# Patient Record
Sex: FEMALE | Race: WHITE | NOT HISPANIC OR LATINO | Employment: PART TIME | URBAN - METROPOLITAN AREA
[De-identification: names, ages, dates, MRNs, and addresses within clinical notes are randomized per-mention and may not be internally consistent; named-entity substitution may affect disease eponyms.]

---

## 2022-10-27 ENCOUNTER — OFFICE VISIT (OUTPATIENT)
Dept: URGENT CARE | Facility: CLINIC | Age: 22
End: 2022-10-27
Payer: COMMERCIAL

## 2022-10-27 VITALS
TEMPERATURE: 97.5 F | RESPIRATION RATE: 16 BRPM | BODY MASS INDEX: 15.43 KG/M2 | DIASTOLIC BLOOD PRESSURE: 56 MMHG | SYSTOLIC BLOOD PRESSURE: 100 MMHG | HEIGHT: 66 IN | OXYGEN SATURATION: 97 % | HEART RATE: 102 BPM | WEIGHT: 96 LBS

## 2022-10-27 DIAGNOSIS — J02.0 STREP PHARYNGITIS: ICD-10-CM

## 2022-10-27 LAB
INT CON NEG: NORMAL
INT CON POS: NORMAL
S PYO AG THROAT QL: POSITIVE

## 2022-10-27 PROCEDURE — 87880 STREP A ASSAY W/OPTIC: CPT | Performed by: STUDENT IN AN ORGANIZED HEALTH CARE EDUCATION/TRAINING PROGRAM

## 2022-10-27 PROCEDURE — 99203 OFFICE O/P NEW LOW 30 MIN: CPT | Performed by: STUDENT IN AN ORGANIZED HEALTH CARE EDUCATION/TRAINING PROGRAM

## 2022-10-27 RX ORDER — DROSPIRENONE AND ETHINYL ESTRADIOL 0.02-3(28)
1 KIT ORAL DAILY
COMMUNITY
Start: 2022-08-19

## 2022-10-27 RX ORDER — AMOXICILLIN 500 MG/1
1000 CAPSULE ORAL DAILY
Qty: 20 CAPSULE | Refills: 0 | Status: SHIPPED | OUTPATIENT
Start: 2022-10-27 | End: 2022-11-06

## 2022-10-27 NOTE — PROGRESS NOTES
"Subjective:   Mckenna Portillo is a 22 y.o. female who presents for Pharyngitis (Pt has a fever, sore throat, chills x 3 day )      HPI:  Pleasant 22-year-old female presents to clinic for 3 days of sore throat and chills.  No recent sick contacts or recent travel.  She has been using some ibuprofen for her symptoms with improvement.  She is able to maintain adequate oral intake of fluids and solids.  No worsening factors.  She denies current fever, diaphoresis, rash, ear pain, ear discharge, nasal congestion, sinus pain, eye discharge, eye redness, chest pain, cough, sputum production, shortness of breath, nausea, vomiting, abdominal pain, dizziness, or headache.    Medications:    amoxicillin Caps  drospirenone-ethinyl estradiol    Allergies: Patient has no known allergies.    Problem List: Mckenna Portillo does not have a problem list on file.    Surgical History:  No past surgical history on file.    Past Social Hx: Mckenna Portillo  reports that she has never smoked. She has never used smokeless tobacco. She reports that she does not currently use alcohol. She reports that she does not currently use drugs.     Past Family Hx:  Mckenna Portillo family history is not on file.     Problem list, medications, and allergies reviewed by myself today in Epic.     Objective:     /56 (BP Location: Left arm, Patient Position: Sitting, BP Cuff Size: Small adult)   Pulse (!) 102   Temp 36.4 °C (97.5 °F) (Temporal)   Resp 16   Ht 1.676 m (5' 6\")   Wt 43.5 kg (96 lb)   SpO2 97%   BMI 15.49 kg/m²     Physical Exam  Vitals reviewed.   Constitutional:       General: She is not in acute distress.     Appearance: Normal appearance.   HENT:      Head: Normocephalic.      Right Ear: Tympanic membrane, ear canal and external ear normal.      Left Ear: Tympanic membrane, ear canal and external ear normal.      Nose: Nose normal.      Mouth/Throat:      Mouth: Mucous membranes are moist.      Pharynx: Oropharynx is clear. " Uvula midline. Posterior oropharyngeal erythema present. No pharyngeal swelling, oropharyngeal exudate or uvula swelling.      Tonsils: Tonsillar exudate present. No tonsillar abscesses. 3+ on the right. 3+ on the left.   Eyes:      Conjunctiva/sclera: Conjunctivae normal.      Pupils: Pupils are equal, round, and reactive to light.   Cardiovascular:      Rate and Rhythm: Normal rate and regular rhythm.      Pulses: Normal pulses.      Heart sounds: Normal heart sounds. No murmur heard.  Pulmonary:      Effort: Pulmonary effort is normal. No respiratory distress.      Breath sounds: Normal breath sounds. No stridor. No wheezing, rhonchi or rales.   Musculoskeletal:      Cervical back: Normal range of motion.   Lymphadenopathy:      Cervical: No cervical adenopathy.   Skin:     General: Skin is warm and dry.      Capillary Refill: Capillary refill takes less than 2 seconds.      Findings: No erythema, lesion or rash.   Neurological:      General: No focal deficit present.      Mental Status: She is alert and oriented to person, place, and time.       Lab Results/POC Test Results   Results for orders placed or performed in visit on 10/27/22   POCT Rapid Strep A   Result Value Ref Range    Rapid Strep Screen positive     Internal Control Positive Valid     Internal Control Negative Valid            Assessment/Plan:     Diagnosis and associated orders:     1. Strep pharyngitis  POCT Rapid Strep A    amoxicillin (AMOXIL) 500 MG Cap         Comments/MDM:     POCT strep a positive.  Patient's presentation to the exam findings are consistent with a diagnosis of strep pharyngitis.  Patient's vitals are all within normal limits except for slightly elevated heart rate of 102.  I do suspect that this is related to her current infection.  She is afebrile.  We will treat with amoxicillin.  Patient was educated use this medication and possible side effects including allergic reaction.  Patient denies known allergy to this  medication.  She may also use ibuprofen/Tylenol as needed, warm salt water gargles, humidifier, nasal saline spray, and plenty of oral hydration with warm/cold fluids.  Patient is nontoxic and stable this time.  ED/return precautions were given.         Differential diagnosis, natural history, supportive care, and indications for immediate follow-up discussed.    Advised the patient to follow-up with the primary care physician for recheck, reevaluation, and consideration of further management.    Please note that this dictation was created using voice recognition software. I have made a reasonable attempt to correct obvious errors, but I expect that there are errors of grammar and possibly content that I did not discover before finalizing the note.    Electronically signed by Sanchez Aguayo PA-C.

## 2022-11-08 ENCOUNTER — OFFICE VISIT (OUTPATIENT)
Dept: URGENT CARE | Facility: CLINIC | Age: 22
End: 2022-11-08
Payer: COMMERCIAL

## 2022-11-08 VITALS
DIASTOLIC BLOOD PRESSURE: 70 MMHG | RESPIRATION RATE: 16 BRPM | HEIGHT: 65 IN | HEART RATE: 124 BPM | WEIGHT: 98.2 LBS | TEMPERATURE: 97.2 F | BODY MASS INDEX: 16.36 KG/M2 | SYSTOLIC BLOOD PRESSURE: 116 MMHG | OXYGEN SATURATION: 98 %

## 2022-11-08 DIAGNOSIS — J03.01 RECURRENT STREPTOCOCCAL TONSILLITIS: Primary | ICD-10-CM

## 2022-11-08 DIAGNOSIS — J02.9 PHARYNGITIS, UNSPECIFIED ETIOLOGY: ICD-10-CM

## 2022-11-08 LAB
INT CON NEG: NORMAL
INT CON POS: NORMAL
S PYO AG THROAT QL: POSITIVE

## 2022-11-08 PROCEDURE — 99213 OFFICE O/P EST LOW 20 MIN: CPT | Performed by: PHYSICIAN ASSISTANT

## 2022-11-08 PROCEDURE — 87880 STREP A ASSAY W/OPTIC: CPT | Performed by: PHYSICIAN ASSISTANT

## 2022-11-08 RX ORDER — AMOXICILLIN AND CLAVULANATE POTASSIUM 875; 125 MG/1; MG/1
1 TABLET, FILM COATED ORAL 2 TIMES DAILY
Qty: 20 TABLET | Refills: 0 | Status: SHIPPED | OUTPATIENT
Start: 2022-11-08 | End: 2022-12-08

## 2022-11-08 ASSESSMENT — ENCOUNTER SYMPTOMS
SHORTNESS OF BREATH: 0
ABDOMINAL PAIN: 0
SPUTUM PRODUCTION: 0
SORE THROAT: 1
NAUSEA: 0
CHILLS: 0
COUGH: 0
VOMITING: 0
WHEEZING: 0
FEVER: 0
DIARRHEA: 0

## 2022-11-08 NOTE — PROGRESS NOTES
"Subjective:   Mckenna Portillo  is a 22 y.o. female who presents for Pharyngitis      Pharyngitis   Pertinent negatives include no abdominal pain, congestion, coughing, diarrhea, ear pain, shortness of breath or vomiting.   Patient presents urgent care complaint of sore throat.  She was seen to the urgent care 12 days ago and tested positive for strep pharyngitis.  She was prescribed amoxicillin 500 mg twice daily x10 days.  She reported completing antibiotic without issue.  She states she had around 2 days of improved symptoms thereafter.  She abruptly noted sore throat with white patches again this morning.  Did have some chills and body aches this morning.  Denies cough.  Denies nausea vomiting abdominal pain diarrhea or rash.  Patient does have mild right ear discomfort.  Did take some Advil this morning.  Denies past medical history of recurrence of strep but has had strep many times.  Patient declines ENT referral.    Review of Systems   Constitutional:  Negative for chills and fever.   HENT:  Positive for sore throat. Negative for congestion and ear pain.    Respiratory:  Negative for cough, sputum production, shortness of breath and wheezing.    Gastrointestinal:  Negative for abdominal pain, diarrhea, nausea and vomiting.   Skin:  Negative for rash.     No Known Allergies     Objective:   /70 (BP Location: Right arm, Patient Position: Sitting, BP Cuff Size: Adult long)   Pulse (!) 124   Temp 36.2 °C (97.2 °F)   Resp 16   Ht 1.651 m (5' 5\")   Wt 44.5 kg (98 lb 3.2 oz)   SpO2 98%   BMI 16.34 kg/m²     Physical Exam  Vitals and nursing note reviewed.   Constitutional:       General: She is not in acute distress.     Appearance: She is well-developed. She is not diaphoretic.   HENT:      Head: Normocephalic and atraumatic.      Right Ear: Tympanic membrane, ear canal and external ear normal.      Left Ear: Tympanic membrane, ear canal and external ear normal.      Nose: Nose normal.      " Mouth/Throat:      Lips: Pink.      Mouth: Mucous membranes are moist.      Pharynx: Uvula midline. Posterior oropharyngeal erythema (deeper) present. No oropharyngeal exudate.      Tonsils: Tonsillar exudate present. No tonsillar abscesses. 1+ on the right. 1+ on the left.   Eyes:      General: Lids are normal. No scleral icterus.        Right eye: No discharge.         Left eye: No discharge.      Conjunctiva/sclera: Conjunctivae normal.   Pulmonary:      Effort: Pulmonary effort is normal. No respiratory distress.      Breath sounds: Normal breath sounds. No stridor. No decreased breath sounds, wheezing, rhonchi or rales.   Musculoskeletal:         General: Normal range of motion.      Cervical back: Neck supple.   Skin:     General: Skin is warm and dry.      Coloration: Skin is not pale.      Findings: No erythema.   Neurological:      Mental Status: She is alert and oriented to person, place, and time. She is not disoriented.   Psychiatric:         Speech: Speech normal.         Behavior: Behavior normal.   Point-of-care testing for strep is positive    Assessment/Plan:   1. Recurrent streptococcal tonsillitis  - amoxicillin-clavulanate (AUGMENTIN) 875-125 MG Tab; Take 1 Tablet by mouth 2 times a day.  Dispense: 20 Tablet; Refill: 0    2. Pharyngitis, unspecified etiology  - POCT Rapid Strep A  Supportive care is reviewed with patient/caregiver - recommend to push PO fluids and electrolytes,  take full course of Rx, take with probiotics, observe for resolution  Return to clinic with lack of resolution or progression of symptoms.  Patient declines ENT referral.  I have worn an N95 mask, gloves and eye protection for the entire encounter with this patient.     Differential diagnosis, natural history, supportive care, and indications for immediate follow-up discussed.

## 2022-12-08 ENCOUNTER — OFFICE VISIT (OUTPATIENT)
Dept: URGENT CARE | Facility: CLINIC | Age: 22
End: 2022-12-08
Payer: COMMERCIAL

## 2022-12-08 VITALS
OXYGEN SATURATION: 100 % | SYSTOLIC BLOOD PRESSURE: 102 MMHG | TEMPERATURE: 98.3 F | HEART RATE: 117 BPM | RESPIRATION RATE: 12 BRPM | WEIGHT: 98.8 LBS | HEIGHT: 65 IN | BODY MASS INDEX: 16.46 KG/M2 | DIASTOLIC BLOOD PRESSURE: 58 MMHG

## 2022-12-08 DIAGNOSIS — J03.01 RECURRENT STREPTOCOCCAL TONSILLITIS: ICD-10-CM

## 2022-12-08 LAB
INT CON NEG: NORMAL
INT CON POS: NORMAL
S PYO AG THROAT QL: POSITIVE

## 2022-12-08 PROCEDURE — 99213 OFFICE O/P EST LOW 20 MIN: CPT | Performed by: PHYSICIAN ASSISTANT

## 2022-12-08 PROCEDURE — 87880 STREP A ASSAY W/OPTIC: CPT | Performed by: PHYSICIAN ASSISTANT

## 2022-12-08 RX ORDER — AMOXICILLIN AND CLAVULANATE POTASSIUM 875; 125 MG/1; MG/1
1 TABLET, FILM COATED ORAL 2 TIMES DAILY
Qty: 20 TABLET | Refills: 0 | Status: SHIPPED | OUTPATIENT
Start: 2022-12-08 | End: 2022-12-08

## 2022-12-08 RX ORDER — AZITHROMYCIN 250 MG/1
TABLET, FILM COATED ORAL
Qty: 6 TABLET | Refills: 0 | Status: SHIPPED | OUTPATIENT
Start: 2022-12-08

## 2022-12-08 ASSESSMENT — ENCOUNTER SYMPTOMS
ABDOMINAL PAIN: 0
COUGH: 0
SORE THROAT: 1
DIARRHEA: 0
CHILLS: 0
FEVER: 1
NAUSEA: 0
VOMITING: 0

## 2022-12-08 NOTE — PROGRESS NOTES
"Subjective:   Mckenna Portillo  is a 22 y.o. female who presents for Pharyngitis (Pt has a fever, sore throat, white spots on back of throat x last night)      Pharyngitis   Pertinent negatives include no abdominal pain, coughing, diarrhea (resolved), ear pain or vomiting. Patient presents urgent care noting last 24 hours of sore throat.  Patient has had strep pharyngitis twice over the last 6 weeks.  She was treated first with amoxicillin and thereafter with Augmentin.  She reports complete resolution of symptoms for the last over 10 days.  Complains of return of symptoms of sore throat and body aches yesterday.  Denies cough.  Denies ear pain.  Denies nausea vomiting abdominal pain.  Patient did have diarrhea the other day.  Did have strep pharyngitis on high school but has not prior to the above-mentioned episodes since.  At last evaluation she declined an ENT referral but is interested in seeing ENT now.  Patient would like to avoid Augmentin and prefers azithromycin for coverage at this time.    Review of Systems   Constitutional:  Positive for fever. Negative for chills.   HENT:  Positive for sore throat. Negative for ear pain.    Respiratory:  Negative for cough.    Gastrointestinal:  Negative for abdominal pain, diarrhea (resolved), nausea and vomiting.   Skin:  Negative for rash.     No Known Allergies     Objective:   /58 (BP Location: Left arm, Patient Position: Sitting, BP Cuff Size: Small adult)   Pulse (!) 117   Temp 36.8 °C (98.3 °F) (Temporal)   Resp 12   Ht 1.651 m (5' 5\")   Wt 44.8 kg (98 lb 12.8 oz)   SpO2 100%   BMI 16.44 kg/m²     Physical Exam  Vitals and nursing note reviewed.   Constitutional:       General: She is not in acute distress.     Appearance: She is well-developed. She is not diaphoretic.   HENT:      Head: Normocephalic and atraumatic.      Right Ear: Tympanic membrane, ear canal and external ear normal.      Left Ear: Tympanic membrane, ear canal and external ear " normal.      Nose: Nose normal.      Mouth/Throat:      Lips: Pink.      Mouth: Mucous membranes are moist.      Pharynx: Uvula midline. Posterior oropharyngeal erythema (deeper) present. No oropharyngeal exudate.      Tonsils: Tonsillar exudate present. No tonsillar abscesses. 2+ on the right. 2+ on the left.   Eyes:      General: Lids are normal. No scleral icterus.        Right eye: No discharge.         Left eye: No discharge.      Conjunctiva/sclera: Conjunctivae normal.   Pulmonary:      Effort: Pulmonary effort is normal. No respiratory distress.      Breath sounds: Normal breath sounds. No stridor. No decreased breath sounds, wheezing, rhonchi or rales.   Musculoskeletal:         General: Normal range of motion.      Cervical back: Neck supple.   Skin:     General: Skin is warm and dry.      Coloration: Skin is not pale.      Findings: No erythema.   Neurological:      Mental Status: She is alert and oriented to person, place, and time. She is not disoriented.   Psychiatric:         Speech: Speech normal.         Behavior: Behavior normal.   Point-of-care testing for strep pharyngitis is positive    Assessment/Plan:   1. Recurrent streptococcal tonsillitis  - POCT Rapid Strep A  - Referral to ENT  - azithromycin (ZITHROMAX) 250 MG Tab; Take as directed on package. Dispense one package.  Dispense: 6 Tablet; Refill: 0  Supportive care is reviewed with patient/caregiver - recommend to push PO fluids and electrolytes,  take full course of Rx, take with probiotics, observe for resolution  Return to clinic with lack of resolution or progression of symptoms.  Stressed the importance of probiotics.  ENT referral placed follow-up with ENT.  Azithromycin per patient request.    I have worn an N95 mask, gloves and eye protection for the entire encounter with this patient.     Differential diagnosis, natural history, supportive care, and indications for immediate follow-up discussed.

## 2023-08-17 ENCOUNTER — OFFICE VISIT (OUTPATIENT)
Dept: URGENT CARE | Facility: CLINIC | Age: 23
End: 2023-08-17
Payer: COMMERCIAL

## 2023-08-17 VITALS
SYSTOLIC BLOOD PRESSURE: 102 MMHG | DIASTOLIC BLOOD PRESSURE: 60 MMHG | OXYGEN SATURATION: 97 % | TEMPERATURE: 97.5 F | WEIGHT: 100 LBS | HEART RATE: 100 BPM | RESPIRATION RATE: 16 BRPM | HEIGHT: 65 IN | BODY MASS INDEX: 16.66 KG/M2

## 2023-08-17 DIAGNOSIS — J02.9 SORE THROAT: ICD-10-CM

## 2023-08-17 DIAGNOSIS — J02.8 ACUTE PHARYNGITIS DUE TO OTHER SPECIFIED ORGANISMS: ICD-10-CM

## 2023-08-17 DIAGNOSIS — R11.0 NAUSEA: ICD-10-CM

## 2023-08-17 LAB — S PYO DNA SPEC NAA+PROBE: NOT DETECTED

## 2023-08-17 PROCEDURE — 99214 OFFICE O/P EST MOD 30 MIN: CPT | Performed by: NURSE PRACTITIONER

## 2023-08-17 PROCEDURE — 87651 STREP A DNA AMP PROBE: CPT | Performed by: NURSE PRACTITIONER

## 2023-08-17 PROCEDURE — 3074F SYST BP LT 130 MM HG: CPT | Performed by: NURSE PRACTITIONER

## 2023-08-17 PROCEDURE — 3078F DIAST BP <80 MM HG: CPT | Performed by: NURSE PRACTITIONER

## 2023-08-17 RX ORDER — ONDANSETRON 4 MG/1
4 TABLET, FILM COATED ORAL EVERY 6 HOURS PRN
Qty: 20 TABLET | Refills: 0 | Status: SHIPPED | OUTPATIENT
Start: 2023-08-17 | End: 2023-08-22

## 2023-08-17 ASSESSMENT — ENCOUNTER SYMPTOMS
CHILLS: 0
SORE THROAT: 1
NAUSEA: 1
ORTHOPNEA: 0
VOMITING: 1
COUGH: 0
MYALGIAS: 0
HEADACHES: 0
FEVER: 0
DIARRHEA: 0
EYE DISCHARGE: 0

## 2023-08-17 NOTE — PROGRESS NOTES
Subjective     Mckenna Portillo is a 23 y.o. female who presents with Pharyngitis (X 3 days, sore throat, white spots on back of throat.  Vomited once last night)            HPI  New problem.  Patient is a 23-year-old female who presents with a 3-day history of sore throat, nausea, vomiting, and fever.  She denies any congestion, cough, runny nose, myalgia, or headache.  She states she has not had any vomiting today however she is still nauseous.  She has been taking over-the-counter medications for her symptoms.    Patient has no known allergies.  Current Outpatient Medications on File Prior to Visit   Medication Sig Dispense Refill    azithromycin (ZITHROMAX) 250 MG Tab Take as directed on package. Dispense one package. (Patient not taking: Reported on 8/17/2023) 6 Tablet 0    drospirenone-ethinyl estradiol (ARMANDO) 3-0.02 MG per tablet Take 1 Tablet by mouth every day. (Patient not taking: Reported on 8/17/2023)       No current facility-administered medications on file prior to visit.     Social History     Socioeconomic History    Marital status: Single     Spouse name: Not on file    Number of children: Not on file    Years of education: Not on file    Highest education level: Not on file   Occupational History    Not on file   Tobacco Use    Smoking status: Never    Smokeless tobacco: Never   Vaping Use    Vaping Use: Never used   Substance and Sexual Activity    Alcohol use: Not Currently    Drug use: Not Currently    Sexual activity: Not on file   Other Topics Concern    Not on file   Social History Narrative    Not on file     Social Determinants of Health     Financial Resource Strain: Not on file   Food Insecurity: Not on file   Transportation Needs: Not on file   Physical Activity: Not on file   Stress: Not on file   Social Connections: Not on file   Intimate Partner Violence: Not on file   Housing Stability: Not on file     Breast Cancer-related family history is not on file.      Review of Systems  "  Constitutional:  Positive for malaise/fatigue. Negative for chills and fever.   HENT:  Positive for sore throat. Negative for congestion.    Eyes:  Negative for discharge.   Respiratory:  Negative for cough.    Cardiovascular:  Negative for chest pain and orthopnea.   Gastrointestinal:  Positive for nausea and vomiting. Negative for diarrhea.   Musculoskeletal:  Negative for myalgias.   Neurological:  Negative for headaches.   Endo/Heme/Allergies:  Negative for environmental allergies.              Objective     /60 (BP Location: Left arm, Patient Position: Sitting, BP Cuff Size: Adult)   Pulse 100   Temp 36.4 °C (97.5 °F) (Temporal)   Resp 16   Ht 1.651 m (5' 5\")   Wt 45.4 kg (100 lb)   SpO2 97%   BMI 16.64 kg/m²      Physical Exam  Vitals and nursing note reviewed.   Constitutional:       General: She is not in acute distress.     Appearance: She is well-developed.   HENT:      Head: Normocephalic.      Right Ear: Tympanic membrane and external ear normal.      Left Ear: Tympanic membrane and external ear normal.      Nose: Mucosal edema present. No rhinorrhea.      Mouth/Throat:      Pharynx: Posterior oropharyngeal erythema present.   Eyes:      General:         Right eye: No discharge.         Left eye: No discharge.      Conjunctiva/sclera: Conjunctivae normal.   Cardiovascular:      Rate and Rhythm: Normal rate and regular rhythm.      Heart sounds: Normal heart sounds.   Pulmonary:      Effort: Pulmonary effort is normal.      Breath sounds: Normal breath sounds.   Musculoskeletal:         General: Normal range of motion.      Cervical back: Normal range of motion and neck supple.   Lymphadenopathy:      Cervical: No cervical adenopathy.   Skin:     General: Skin is warm and dry.   Neurological:      Mental Status: She is alert and oriented to person, place, and time.   Psychiatric:         Behavior: Behavior normal.         Thought Content: Thought content normal.                       "       Assessment & Plan        1. Acute pharyngitis due to other specified organisms        2. Sore throat  POCT CEPHEID GROUP A STREP - PCR      3. Nausea  ondansetron (ZOFRAN) 4 MG Tab tablet        Strep negative.  Zofran for her nausea.  Viral illness at this time with no indication for antibiotics. Reviewed with patient expected course of illness and also reviewed OTC medications that may be used for symptom relief. Follow up 5 days if not improving.

## 2023-08-20 ENCOUNTER — OFFICE VISIT (OUTPATIENT)
Dept: URGENT CARE | Facility: CLINIC | Age: 23
End: 2023-08-20
Payer: COMMERCIAL

## 2023-08-20 ENCOUNTER — HOSPITAL ENCOUNTER (OUTPATIENT)
Facility: MEDICAL CENTER | Age: 23
End: 2023-08-20
Payer: COMMERCIAL

## 2023-08-20 VITALS
WEIGHT: 100 LBS | SYSTOLIC BLOOD PRESSURE: 106 MMHG | DIASTOLIC BLOOD PRESSURE: 68 MMHG | RESPIRATION RATE: 16 BRPM | HEIGHT: 65 IN | TEMPERATURE: 99.1 F | OXYGEN SATURATION: 98 % | BODY MASS INDEX: 16.66 KG/M2

## 2023-08-20 DIAGNOSIS — R53.83 FATIGUE, UNSPECIFIED TYPE: ICD-10-CM

## 2023-08-20 DIAGNOSIS — R05.1 ACUTE COUGH: ICD-10-CM

## 2023-08-20 DIAGNOSIS — R09.81 NASAL CONGESTION: ICD-10-CM

## 2023-08-20 DIAGNOSIS — J02.9 PHARYNGITIS, UNSPECIFIED ETIOLOGY: ICD-10-CM

## 2023-08-20 DIAGNOSIS — J03.90 TONSILLITIS WITH EXUDATE: ICD-10-CM

## 2023-08-20 DIAGNOSIS — R52 BODY ACHES: ICD-10-CM

## 2023-08-20 LAB
FLUAV RNA SPEC QL NAA+PROBE: NEGATIVE
FLUBV RNA SPEC QL NAA+PROBE: NEGATIVE
RSV RNA SPEC QL NAA+PROBE: NEGATIVE
SARS-COV-2 RNA RESP QL NAA+PROBE: NEGATIVE

## 2023-08-20 PROCEDURE — 3074F SYST BP LT 130 MM HG: CPT

## 2023-08-20 PROCEDURE — 99213 OFFICE O/P EST LOW 20 MIN: CPT

## 2023-08-20 PROCEDURE — 0241U POCT CEPHEID COV-2, FLU A/B, RSV - PCR: CPT

## 2023-08-20 PROCEDURE — 3078F DIAST BP <80 MM HG: CPT

## 2023-08-20 PROCEDURE — 87070 CULTURE OTHR SPECIMN AEROBIC: CPT

## 2023-08-20 RX ORDER — AMOXICILLIN 500 MG/1
500 CAPSULE ORAL 2 TIMES DAILY
Qty: 20 CAPSULE | Refills: 0 | Status: SHIPPED | OUTPATIENT
Start: 2023-08-20 | End: 2023-08-30

## 2023-08-20 NOTE — PROGRESS NOTES
"Subjective     Mckenna Portillo is a 23 y.o. female who presents with Pharyngitis (Patient stated did come to  Thursday 8/17/23 for sore throat, was told to come back if not any better, still sore throat and fever)            HPI patient seen on 8/17/2023 for sore throat, her strep test at that time was negative.  She was told to come back to urgent care if she was still symptomatic in several days.    Patient presents today due to continuing fevers, up to 101.  She states she has also been having chills, body aches some nasal congestion and a slight cough.  She states she has had some nausea and diarrhea but it has resolved.  She states this mostly the sore throat that is bothering her and the fevers.  States that hurts to swallow, it hurts when she eats.      ROS Same as above      No Known Allergies    Current Outpatient Medications   Medication Sig    amoxicillin (AMOXIL) 500 MG Cap Take 1 Capsule by mouth 2 times a day for 10 days.    ondansetron (ZOFRAN) 4 MG Tab tablet Take 1 Tablet by mouth every 6 hours as needed for Nausea/Vomiting for up to 5 days.    azithromycin (ZITHROMAX) 250 MG Tab Take as directed on package. Dispense one package. (Patient not taking: Reported on 8/17/2023)    drospirenone-ethinyl estradiol (ARMANDO) 3-0.02 MG per tablet Take 1 Tablet by mouth every day. (Patient not taking: Reported on 8/17/2023)        History reviewed. No pertinent past medical history.       Objective     /68 (BP Location: Left arm, Patient Position: Sitting, BP Cuff Size: Large adult)   Temp 37.3 °C (99.1 °F)   Resp 16   Ht 1.651 m (5' 5\")   Wt 45.4 kg (100 lb)   SpO2 98%   BMI 16.64 kg/m²      Physical Exam  Constitutional:       Appearance: Normal appearance. She is not ill-appearing.   HENT:      Head: Normocephalic and atraumatic.      Right Ear: Tympanic membrane, ear canal and external ear normal.      Left Ear: Tympanic membrane, ear canal and external ear normal.      Nose: Congestion present. "      Comments: Mild nasal congestion     Mouth/Throat:      Mouth: Mucous membranes are moist.      Pharynx: Uvula midline. Oropharyngeal exudate and posterior oropharyngeal erythema present. No pharyngeal swelling or uvula swelling.      Tonsils: Tonsillar exudate present. No tonsillar abscesses. 2+ on the right. 2+ on the left.   Eyes:      Pupils: Pupils are equal, round, and reactive to light.   Cardiovascular:      Rate and Rhythm: Normal rate and regular rhythm.      Heart sounds: Normal heart sounds. No murmur heard.  Pulmonary:      Effort: Pulmonary effort is normal.      Breath sounds: No wheezing, rhonchi or rales.   Chest:      Chest wall: No tenderness.   Abdominal:      Palpations: Abdomen is soft.   Musculoskeletal:         General: Normal range of motion.      Cervical back: Normal range of motion and neck supple.   Lymphadenopathy:      Head:      Right side of head: Submandibular and tonsillar adenopathy present. No submental, preauricular, posterior auricular or occipital adenopathy.      Left side of head: Submandibular and tonsillar adenopathy present. No submental, preauricular, posterior auricular or occipital adenopathy.      Cervical: Cervical adenopathy present.      Right cervical: Superficial cervical adenopathy present. No deep or posterior cervical adenopathy.     Left cervical: Superficial cervical adenopathy present. No deep or posterior cervical adenopathy.   Skin:     General: Skin is warm and dry.   Neurological:      Mental Status: She is alert and oriented to person, place, and time.              Assessment & Plan     Patient presents today due to continued fevers and increasing sore throat.  She tested negative for strep approximately 3 days ago.  She continues to have increasing sore throat and fevers, some intermittent nausea, intermittent diarrhea.  She was told to come back to urgent care if her symptoms persisted.    On exam she has mild nasal congestion.  There is erythema  to posterior pharynx with exudates, her tonsils are +2 bilaterally with pockets of exudate posteriorly.  No sign of abscess.  Her tympanic membranes are pearly white bilaterally with bony landmarks visible.  There is submandibular, tonsillar, superficial cervical lymphadenopathy bilaterally that are tender with palpation.  Her lung sounds are clear, no wheezing no rhonchi, SPO2 98% clinic today.  Heart sounds are normal, regular rhythm.  Discussed we will send out for throat culture and test her today for COVID/flu/RSV.  Contingent antibiotics have been sent to the pharmacy.  Patient in agreement she will be notified via MyChart on all testing results.  Discussed continued use of OTC Tylenol/ibuprofen, warm salt water gargles, soothing warm and cool liquids, humidifier, hot showers, for symptom management.Differential diagnosis, natural history, supportive care, and indications for immediate follow-up were discussed.         1. Tonsillitis with exudate  POCT CEPHEID COV-2, FLU A/B, RSV - PCR    CULTURE THROAT    amoxicillin (AMOXIL) 500 MG Cap      2. Pharyngitis, unspecified etiology  POCT CEPHEID COV-2, FLU A/B, RSV - PCR    CULTURE THROAT      3. Nasal congestion  POCT CEPHEID COV-2, FLU A/B, RSV - PCR    CULTURE THROAT      4. Acute cough  POCT CEPHEID COV-2, FLU A/B, RSV - PCR    CULTURE THROAT      5. Fatigue, unspecified type  POCT CEPHEID COV-2, FLU A/B, RSV - PCR    CULTURE THROAT      6. Body aches  POCT CEPHEID COV-2, FLU A/B, RSV - PCR    CULTURE THROAT

## 2023-08-22 LAB
BACTERIA SPEC RESP CULT: NORMAL
SIGNIFICANT IND 70042: NORMAL
SITE SITE: NORMAL
SOURCE SOURCE: NORMAL